# Patient Record
Sex: FEMALE | Race: WHITE | NOT HISPANIC OR LATINO
[De-identification: names, ages, dates, MRNs, and addresses within clinical notes are randomized per-mention and may not be internally consistent; named-entity substitution may affect disease eponyms.]

---

## 2020-01-17 ENCOUNTER — TRANSCRIPTION ENCOUNTER (OUTPATIENT)
Age: 80
End: 2020-01-17

## 2022-07-01 PROBLEM — Z00.00 ENCOUNTER FOR PREVENTIVE HEALTH EXAMINATION: Status: ACTIVE | Noted: 2022-07-01

## 2022-07-08 ENCOUNTER — APPOINTMENT (OUTPATIENT)
Dept: ORTHOPEDIC SURGERY | Facility: CLINIC | Age: 82
End: 2022-07-08

## 2022-07-08 VITALS — BODY MASS INDEX: 29.53 KG/M2 | WEIGHT: 173 LBS | HEIGHT: 64 IN

## 2022-07-08 PROCEDURE — 20610 DRAIN/INJ JOINT/BURSA W/O US: CPT

## 2022-07-08 PROCEDURE — J3490M: CUSTOM

## 2022-07-08 PROCEDURE — 99213 OFFICE O/P EST LOW 20 MIN: CPT | Mod: 25

## 2022-07-08 NOTE — HISTORY OF PRESENT ILLNESS
[] : yes [FreeTextEntry1] : left knee  [FreeTextEntry5] : patient started to feel pain again  in the knee about 2 weeks ago. Would like to discuss another cortisone inj  [FreeTextEntry7] : down into the calf

## 2022-07-08 NOTE — IMAGING
[de-identified] : Varus deformity\par Mild effusion, no warmth, no ecchymosis\par Medial joint line tenderness to palpation \par Range of motion 5-110 with associated crepitus\par 5/5 quadriceps and hamstring strength\par Ligamentously stable\par Motor and sensory intact distally\par Mildly antalgic gait\par Negative Veronica\par

## 2022-07-08 NOTE — PROCEDURE
[FreeTextEntry3] : - Large joint injection was performed of the LEFT  knee. \par - The indication for this procedure was pain and inflammation. Patient has tried OTC's including aspirin, Ibuprofen, Aleve, etc or prescription NSAIDS, and/or exercises at home and/or physical therapy without satisfactory response, patient had decreased mobility in the joint and the risks benefits, and alternatives have been discussed, and verbal consent was obtained. The site was prepped with alcohol, betadine, ethyl chloride sprayed topically and sterile technique used. \par - An injection of Betamethasone 2cc; Lidocaine 3cc; Marcaine 3cc injected.\par - Patient was advised to call if redness, pain or fever occur, apply ice for 15 minutes out of every hour for the next 12-24 hours as tolerated and patient was advised to rest the joint(s) for 2 days. \par

## 2022-11-11 ENCOUNTER — APPOINTMENT (OUTPATIENT)
Dept: ORTHOPEDIC SURGERY | Facility: CLINIC | Age: 82
End: 2022-11-11

## 2022-11-11 VITALS — WEIGHT: 173 LBS | HEIGHT: 64 IN | BODY MASS INDEX: 29.53 KG/M2

## 2022-11-11 DIAGNOSIS — I10 ESSENTIAL (PRIMARY) HYPERTENSION: ICD-10-CM

## 2022-11-11 PROCEDURE — J3490M: CUSTOM

## 2022-11-11 PROCEDURE — 99213 OFFICE O/P EST LOW 20 MIN: CPT | Mod: 25

## 2022-11-11 PROCEDURE — 20611 DRAIN/INJ JOINT/BURSA W/US: CPT | Mod: LT

## 2022-11-11 NOTE — IMAGING
[de-identified] : LEFT KNEE:\par Varus deformity\par Mild effusion, no warmth, no ecchymosis\par Medial joint line tenderness to palpation \par Range of motion 5-110 with associated crepitus\par 5/5 quadriceps and hamstring strength\par Ligamentously stable\par Motor and sensory intact distally\par Mildly antalgic gait\par Negative Veronica\par

## 2022-11-11 NOTE — ASSESSMENT
[FreeTextEntry1] : ACUTE EXACERBATION OF CHRONIC LEFT KNEE OA\par STABLE EXAM & SYMPTOMS\par REPEAT CSI DISCUSSED AND DONE TODAY, TOLERATED WELL\par CONSIDER VISCO IF SYMPTOMS PERSIST

## 2022-11-11 NOTE — HISTORY OF PRESENT ILLNESS
[9] : 9 [0] : 0 [Burning] : burning [Occasional] : occasional [Leisure] : leisure [Walking] : walking [Stairs] : stairs [de-identified] : 11/11/22: FOLLOW UP LEFT KNEE, PAIN RETURNING, GOOD RELIEF FROM CSI 4 MONTHS AGO [] : no [FreeTextEntry1] : left knee  [FreeTextEntry5] : Pt states that she hurt her knee while learning how to line dance in July 2022. Pt states she hasn’t returned because the pain was getting better but has been hurting for the past two weeks.  [FreeTextEntry7] : down into the calf and front of the leg [FreeTextEntry8] : getting out of the bed [FreeTextEntry9] : Aspercreme

## 2022-11-11 NOTE — PROCEDURE
[FreeTextEntry3] : Large Joint Injection was performed because of pain and inflammation. Anesthesia: ethyl chloride sprayed topically.. \par Celestone: 2 cc. \par Marcaine: 5 cc. \par \par Medication was injected in the LEFT KNEE. Patient has tried OTC's including aspirin, Ibuprofen, Aleve etc or prescription NSAIDS, and/or exercises at home and/ or physical therapy without satisfactory response and Patient has decreased mobility in the joint. The risks, benefits, and alternatives to cortisone injection were explained in full to the patient. Risks outlined include but are not limited to infection, sepsis, bleeding, scarring, skin discoloration, temporary increase in pain, syncopal episode, failure to resolve symptoms, allergic reaction, symptom recurrence, and elevation of blood sugar in diabetics. Patient understood the risks. All questions were answered. After discussion of options, patient requested an injection. Oral informed consent was obtained and sterile prep was done of the injection site with betadyne and alcohol. Sterile technique was utilized for the procedure including the preparation of the solutions used for the injection. Patient tolerated the procedure well. Advised to ice the injection site this evening. Post Procedure Instructions: Patient was advised to call if redness, pain, or fever occur and apply ice for 15 min. out of every hour for the next 12-24 hours as tolerated. patient was advised to rest the joint(s) for 2 days.

## 2023-01-09 ENCOUNTER — APPOINTMENT (OUTPATIENT)
Dept: ORTHOPEDIC SURGERY | Facility: CLINIC | Age: 83
End: 2023-01-09
Payer: MEDICARE

## 2023-01-09 VITALS — HEIGHT: 64 IN | WEIGHT: 173 LBS | BODY MASS INDEX: 29.53 KG/M2

## 2023-01-09 PROCEDURE — 99213 OFFICE O/P EST LOW 20 MIN: CPT

## 2023-01-09 NOTE — IMAGING
[de-identified] : LEFT KNEE:\par Varus deformity\par Mild effusion, no warmth, no ecchymosis\par Medial joint line tenderness to palpation \par Range of motion 5-110 with associated crepitus\par 5/5 quadriceps and hamstring strength\par Ligamentously stable\par Motor and sensory intact distally\par Mildly antalgic gait\par Negative Veronica\par

## 2023-01-09 NOTE — HISTORY OF PRESENT ILLNESS
[9] : 9 [0] : 0 [Burning] : burning [Intermittent] : intermittent [Leisure] : leisure [Injection therapy] : injection therapy [Walking] : walking [Stairs] : stairs [de-identified] : 11/11/22: FOLLOW UP LEFT KNEE, PAIN RETURNING, GOOD RELIEF FROM CSI 4 MONTHS AGO\par \par 01/09/23 here   for a follow up on the left knee ,pt had a csi 11/11/2022 with some relief  [] : no [FreeTextEntry1] : left knee  [FreeTextEntry5] : Pt states that she hurt her knee while learning how to line dance in July 2022. Pt states she hasn’t returned because the pain was getting better but has been hurting for the past two weeks.  [FreeTextEntry7] : down into the calf and front of the leg [FreeTextEntry8] : getting out of the bed [FreeTextEntry9] : Aspercreme  [de-identified] : csi left knee 11/11/2022

## 2023-01-09 NOTE — ASSESSMENT
[FreeTextEntry1] : ACUTE EXACERBATION OF CHRONIC LEFT KNEE OA\par STABLE EXAM & SYMPTOMS\par CSI 11/22 WITH SOME RELIEF BUT PAIN RETURNING\par DISCUSSED ORTHOVISC, WILL REQUEST FOR AUTH\par RTO P AUTH

## 2023-01-11 RX ORDER — HYALURONATE SODIUM 30 MG/2 ML
30 SYRINGE (ML) INTRAARTICULAR
Qty: 4 | Refills: 0 | Status: ACTIVE | COMMUNITY
Start: 2023-01-11 | End: 1900-01-01

## 2023-02-13 ENCOUNTER — APPOINTMENT (OUTPATIENT)
Dept: ORTHOPEDIC SURGERY | Facility: CLINIC | Age: 83
End: 2023-02-13
Payer: MEDICARE

## 2023-02-13 VITALS — HEIGHT: 64 IN | BODY MASS INDEX: 29.53 KG/M2 | WEIGHT: 173 LBS

## 2023-02-13 PROCEDURE — J3490M: CUSTOM

## 2023-02-13 PROCEDURE — 99213 OFFICE O/P EST LOW 20 MIN: CPT | Mod: 25

## 2023-02-13 PROCEDURE — 20611 DRAIN/INJ JOINT/BURSA W/US: CPT | Mod: LT

## 2023-02-13 NOTE — HISTORY OF PRESENT ILLNESS
[9] : 9 [0] : 0 [Burning] : burning [Intermittent] : intermittent [Leisure] : leisure [Injection therapy] : injection therapy [Walking] : walking [Stairs] : stairs [de-identified] : 11/11/22: FOLLOW UP LEFT KNEE, PAIN RETURNING, GOOD RELIEF FROM CSI 4 MONTHS AGO\par \par 01/09/23 here   for a follow up on the left knee ,pt had a csi 11/11/2022 with some relief \par \par 02/13/2023 here for a follow up had a csi injection last visit with some relief  [] : no [FreeTextEntry1] : left knee  [FreeTextEntry5] : Pt states that she hurt her knee while learning how to line dance in July 2022. Pt states she hasn’t returned because the pain was getting better but has been hurting for the past two weeks.  [FreeTextEntry7] : down into the calf and front of the leg [FreeTextEntry8] : getting out of the bed [FreeTextEntry9] : Aspercreme  [de-identified] : csi left knee 11/11/2022

## 2023-02-13 NOTE — IMAGING
[de-identified] : LEFT KNEE:\par Varus deformity\par Mild effusion, no warmth, no ecchymosis\par Medial joint line tenderness to palpation \par Range of motion 5-110 with associated crepitus\par 5/5 quadriceps and hamstring strength\par Ligamentously stable\par Motor and sensory intact distally\par Mildly antalgic gait\par Negative Veronica\par

## 2023-02-13 NOTE — ASSESSMENT
[FreeTextEntry1] : ACUTE EXACERBATION OF CHRONIC LEFT KNEE OA\par STABLE EXAM & SYMPTOMS\par CSI 3 MONTHS AGO WITH GOOD RELIEF\par ORTHOVISC COPAY TOO HIGH, WILL SET UP FOR VISCO-3\par REPEAT CSI DISCUSSED AND DONE TODAY, TOLERATED WELL\par RTO AFTER VISCO-3 SET UP

## 2023-02-13 NOTE — PROCEDURE
[FreeTextEntry3] : - Large joint injection was performed of the LEFT knee. \par - The indication for this procedure was pain and inflammation. Patient has tried OTC's including aspirin, Ibuprofen, Aleve, etc or prescription NSAIDS, and/or exercises at home and/or physical therapy without satisfactory response, patient had decreased mobility in the joint and the risks benefits, and alternatives have been discussed, and verbal consent was obtained. The site was prepped with alcohol, betadine, ethyl chloride sprayed topically and sterile technique used. \par - An injection of Betamethasone 2cc; Marcaine 5cc injected.\par - Patient was advised to call if redness, pain or fever occur, apply ice for 15 minutes out of every hour for the next 12-24 hours as tolerated and patient was advised to rest the joint(s) for 2 days. \par

## 2023-02-27 ENCOUNTER — APPOINTMENT (OUTPATIENT)
Dept: ORTHOPEDIC SURGERY | Facility: CLINIC | Age: 83
End: 2023-02-27
Payer: MEDICARE

## 2023-02-27 VITALS — WEIGHT: 173 LBS | BODY MASS INDEX: 29.53 KG/M2 | HEIGHT: 64 IN

## 2023-02-27 PROCEDURE — 20610 DRAIN/INJ JOINT/BURSA W/O US: CPT | Mod: LT

## 2023-02-27 PROCEDURE — 99024 POSTOP FOLLOW-UP VISIT: CPT

## 2023-02-27 NOTE — HISTORY OF PRESENT ILLNESS
[Gradual] : gradual [4] : 4 [3] : 3 [Localized] : localized [Intermittent] : intermittent [Household chores] : household chores [Injection therapy] : injection therapy [Standing] : standing [Walking] : walking [Stairs] : stairs [de-identified] : 02/27/2023 here to star left knee orthovisc # 1  [] : no [de-identified] : csi injection

## 2023-02-27 NOTE — IMAGING
[de-identified] : LEFT KNEE:\par Varus deformity\par Mild effusion, no warmth, no ecchymosis\par Medial joint line tenderness to palpation \par Range of motion 5-110 with associated crepitus\par 5/5 quadriceps and hamstring strength\par Ligamentously stable\par Motor and sensory intact distally\par Mildly antalgic gait\par Negative Veronica\par

## 2023-02-27 NOTE — PROCEDURE
[FreeTextEntry3] : Large joint injection  was performed of the LEFT  knee. \par The indication for this procedure was pain, inflammation and x-ray evidence of Osteoarthritis on this or prior visit. The site was prepped with alcohol, betadine, ethyl chloride sprayed topically and sterile technique used. \par An injection of ORTHOVISC 2ML series; #1  was used.  \par Patient was advised to call if redness, pain or fever occur, apply ice for 15 minutes out of every hour for the next 12-24 hours as tolerated and patient was advised to rest the joint(s) for 2 days. Patient has tried OTC's including aspirin, Ibuprofen, Aleve, etc or prescription NSAIDS, and/or exercises at home and/or physical therapy without satisfactory response, patient had decreased mobility in the joint and the risks benefits, and alternatives have been discussed, and verbal consent was obtained. \par

## 2023-02-27 NOTE — ASSESSMENT
[FreeTextEntry1] : STABLE EXAM & SYMPTOMS\par R/B/A ORTHOVISC DISCUSSED\par ORTHOVISC #1 LEFT KNEE, TOLERATED WELL\par RTO 1 WEEK TO CONT SERIES

## 2023-03-06 ENCOUNTER — APPOINTMENT (OUTPATIENT)
Dept: ORTHOPEDIC SURGERY | Facility: CLINIC | Age: 83
End: 2023-03-06
Payer: MEDICARE

## 2023-03-06 VITALS — WEIGHT: 173 LBS | HEIGHT: 64 IN | BODY MASS INDEX: 29.53 KG/M2

## 2023-03-06 DIAGNOSIS — M54.16 RADICULOPATHY, LUMBAR REGION: ICD-10-CM

## 2023-03-06 PROCEDURE — 99213 OFFICE O/P EST LOW 20 MIN: CPT | Mod: 25

## 2023-03-06 PROCEDURE — 20610 DRAIN/INJ JOINT/BURSA W/O US: CPT | Mod: LT

## 2023-03-06 RX ORDER — METHYLPREDNISOLONE 4 MG/1
4 TABLET ORAL
Qty: 1 | Refills: 0 | Status: ACTIVE | COMMUNITY
Start: 2023-03-06 | End: 1900-01-01

## 2023-03-06 NOTE — HISTORY OF PRESENT ILLNESS
[Gradual] : gradual [4] : 4 [3] : 3 [Localized] : localized [Intermittent] : intermittent [Household chores] : household chores [Injection therapy] : injection therapy [Standing] : standing [Walking] : walking [Stairs] : stairs [de-identified] : 02/27/2023 here to star left knee orthovisc # 1 \par \par 03/06/2023 left knee orthovisc # 2  [] : no [de-identified] : csi injection \par orthovisc injection

## 2023-03-06 NOTE — PROCEDURE
[FreeTextEntry3] : Large joint injection  was performed of the LEFT  knee. \par The indication for this procedure was pain, inflammation and x-ray evidence of Osteoarthritis on this or prior visit. The site was prepped with alcohol, betadine, ethyl chloride sprayed topically and sterile technique used. \par An injection of ORTHOVISC 2ML series; #2  was used.  \par Patient was advised to call if redness, pain or fever occur, apply ice for 15 minutes out of every hour for the next 12-24 hours as tolerated and patient was advised to rest the joint(s) for 2 days. Patient has tried OTC's including aspirin, Ibuprofen, Aleve, etc or prescription NSAIDS, and/or exercises at home and/or physical therapy without satisfactory response, patient had decreased mobility in the joint and the risks benefits, and alternatives have been discussed, and verbal consent was obtained. \par

## 2023-03-06 NOTE — ASSESSMENT
[FreeTextEntry1] : EXACERBATION OF CHRONIC LUMBAR RADICULOPATHY\par PRIOR RELIEF WITH MDP AND PT UNDER LIPPE, RX RENEWED TODAY\par ORTHOVISC #2 TODAY\par F/U 3-4 WKS WITH [facility]ARIO FOR THE BACK

## 2023-03-06 NOTE — IMAGING
[de-identified] : LEFT KNEE:\par Varus deformity\par Mild effusion, no warmth, no ecchymosis\par Medial joint line tenderness to palpation \par Range of motion 5-110 with associated crepitus\par 5/5 quadriceps and hamstring strength\par Ligamentously stable\par Motor and sensory intact distally\par Mildly antalgic gait\par Negative Veronica\par \par 5/5 Motor exam; no focal deficits.\par Positive straight leg raise\par No ankle clonus\par Negative Veronica\par Reflexes +2\par Stiff back gait pattern\par \par

## 2023-03-13 ENCOUNTER — APPOINTMENT (OUTPATIENT)
Dept: ORTHOPEDIC SURGERY | Facility: CLINIC | Age: 83
End: 2023-03-13
Payer: MEDICARE

## 2023-03-13 PROCEDURE — 99024 POSTOP FOLLOW-UP VISIT: CPT

## 2023-03-13 PROCEDURE — 20610 DRAIN/INJ JOINT/BURSA W/O US: CPT | Mod: LT

## 2023-03-13 NOTE — HISTORY OF PRESENT ILLNESS
[Gradual] : gradual [4] : 4 [3] : 3 [Localized] : localized [Intermittent] : intermittent [Household chores] : household chores [Injection therapy] : injection therapy [Standing] : standing [Walking] : walking [Stairs] : stairs [de-identified] : 02/27/2023 here to star left knee orthovisc # 1 \par \par 03/06/2023 left knee orthovisc # 2 \par \par 03/13/2023 left knee orthovisc injection # 3  [] : no [de-identified] : csi injection \par orthovisc injection

## 2023-03-13 NOTE — PROCEDURE
[FreeTextEntry3] : Large joint injection  was performed of the LEFT  knee. \par The indication for this procedure was pain, inflammation and x-ray evidence of Osteoarthritis on this or prior visit. The site was prepped with alcohol, betadine, ethyl chloride sprayed topically and sterile technique used. \par An injection of ORTHOVISC 2ML series; #3  was used.  \par Patient was advised to call if redness, pain or fever occur, apply ice for 15 minutes out of every hour for the next 12-24 hours as tolerated and patient was advised to rest the joint(s) for 2 days. Patient has tried OTC's including aspirin, Ibuprofen, Aleve, etc or prescription NSAIDS, and/or exercises at home and/or physical therapy without satisfactory response, patient had decreased mobility in the joint and the risks benefits, and alternatives have been discussed, and verbal consent was obtained. \par

## 2023-03-20 ENCOUNTER — APPOINTMENT (OUTPATIENT)
Dept: ORTHOPEDIC SURGERY | Facility: CLINIC | Age: 83
End: 2023-03-20
Payer: MEDICARE

## 2023-03-20 DIAGNOSIS — M17.12 UNILATERAL PRIMARY OSTEOARTHRITIS, LEFT KNEE: ICD-10-CM

## 2023-03-20 PROCEDURE — 20610 DRAIN/INJ JOINT/BURSA W/O US: CPT | Mod: LT

## 2023-03-20 PROCEDURE — 99024 POSTOP FOLLOW-UP VISIT: CPT

## 2023-03-20 NOTE — PROCEDURE
[FreeTextEntry3] : Large joint injection  was performed of the LEFT  knee. \par The indication for this procedure was pain, inflammation and x-ray evidence of Osteoarthritis on this or prior visit. The site was prepped with alcohol, betadine, ethyl chloride sprayed topically and sterile technique used. \par An injection of ORTHOVISC 2ML series; #4  was used.  \par Patient was advised to call if redness, pain or fever occur, apply ice for 15 minutes out of every hour for the next 12-24 hours as tolerated and patient was advised to rest the joint(s) for 2 days. Patient has tried OTC's including aspirin, Ibuprofen, Aleve, etc or prescription NSAIDS, and/or exercises at home and/or physical therapy without satisfactory response, patient had decreased mobility in the joint and the risks benefits, and alternatives have been discussed, and verbal consent was obtained. \par

## 2024-07-31 ENCOUNTER — APPOINTMENT (OUTPATIENT)
Dept: ORTHOPEDIC SURGERY | Facility: CLINIC | Age: 84
End: 2024-07-31

## 2024-07-31 VITALS — BODY MASS INDEX: 26.05 KG/M2 | HEIGHT: 63 IN | WEIGHT: 147 LBS

## 2024-07-31 DIAGNOSIS — M65.351 TRIGGER FINGER, RIGHT LITTLE FINGER: ICD-10-CM

## 2024-07-31 PROCEDURE — 99204 OFFICE O/P NEW MOD 45 MIN: CPT | Mod: 25

## 2024-07-31 PROCEDURE — 20550 NJX 1 TENDON SHEATH/LIGAMENT: CPT | Mod: RT

## 2024-07-31 RX ORDER — ALPRAZOLAM 0.5 MG/1
0.5 TABLET ORAL
Refills: 0 | Status: ACTIVE | COMMUNITY

## 2024-07-31 RX ORDER — LISINOPRIL 30 MG/1
TABLET ORAL
Refills: 0 | Status: ACTIVE | COMMUNITY

## 2024-07-31 RX ORDER — LOVASTATIN 20 MG/1
20 TABLET ORAL
Refills: 0 | Status: ACTIVE | COMMUNITY

## 2024-07-31 NOTE — ASSESSMENT
[FreeTextEntry1] : We reviewed the anatomy of the flexor sheath and pathology of trigger fingers with the use of drawings and discussion.  We discussed the treatment options including splinting/nsaids, injection and surgery.  We discussed that too many injections may lead to weakening o the tendon/tendon rupture and the safety of two injections. After a discussion of the risks, benefits and alternatives along with the expectations, the patient was amenable to injection.  The indication for injection is pain and inflammation.  The skin overlying the tendon sheath/A1 pulley site was prepared with alcohol and ethyl chloride was sprayed topically.  Sterile technique was used. An injection of 1ml of lidocaine and 6mg of betamethasone was used.  The patient was instructed to call if redness, pain or fever occur.  They ay apply ice for 15 minutes eery hour for the next 12-24 hours as tolerated. The patient understands that it may take 2-5 days to see a noticeable difference.  Sterile Band-Aid was applied.  injection #2 for right little finger

## 2024-07-31 NOTE — IMAGING
[de-identified] : right pinky TTP a1 pulley +triggering otherwise farom neurovascular intact distally

## 2024-07-31 NOTE — HISTORY OF PRESENT ILLNESS
[de-identified] : 7/31/2024: pt presents with pain and locking in right pinky x 1 month; had similar problem with left pinky in January and she was given a CSI injection at [facility]. Notes finger is catching.  [FreeTextEntry4] : 1 month

## 2024-10-30 ENCOUNTER — NON-APPOINTMENT (OUTPATIENT)
Age: 84
End: 2024-10-30

## 2024-10-30 ENCOUNTER — APPOINTMENT (OUTPATIENT)
Dept: ORTHOPEDIC SURGERY | Facility: CLINIC | Age: 84
End: 2024-10-30
Payer: MEDICARE

## 2024-10-30 ENCOUNTER — APPOINTMENT (OUTPATIENT)
Dept: OTOLARYNGOLOGY | Facility: CLINIC | Age: 84
End: 2024-10-30
Payer: MEDICARE

## 2024-10-30 VITALS
HEIGHT: 63 IN | WEIGHT: 151 LBS | DIASTOLIC BLOOD PRESSURE: 76 MMHG | SYSTOLIC BLOOD PRESSURE: 152 MMHG | HEART RATE: 79 BPM | BODY MASS INDEX: 26.75 KG/M2

## 2024-10-30 DIAGNOSIS — Z86.39 PERSONAL HISTORY OF OTHER ENDOCRINE, NUTRITIONAL AND METABOLIC DISEASE: ICD-10-CM

## 2024-10-30 DIAGNOSIS — M65.351 TRIGGER FINGER, RIGHT LITTLE FINGER: ICD-10-CM

## 2024-10-30 DIAGNOSIS — H90.3 SENSORINEURAL HEARING LOSS, BILATERAL: ICD-10-CM

## 2024-10-30 DIAGNOSIS — Z78.9 OTHER SPECIFIED HEALTH STATUS: ICD-10-CM

## 2024-10-30 PROCEDURE — 92567 TYMPANOMETRY: CPT

## 2024-10-30 PROCEDURE — 99213 OFFICE O/P EST LOW 20 MIN: CPT | Mod: 25

## 2024-10-30 PROCEDURE — 20550 NJX 1 TENDON SHEATH/LIGAMENT: CPT | Mod: RT

## 2024-10-30 PROCEDURE — 92557 COMPREHENSIVE HEARING TEST: CPT

## 2024-10-30 PROCEDURE — 99204 OFFICE O/P NEW MOD 45 MIN: CPT

## 2025-03-05 ENCOUNTER — APPOINTMENT (OUTPATIENT)
Dept: ORTHOPEDIC SURGERY | Facility: CLINIC | Age: 85
End: 2025-03-05
Payer: MEDICARE

## 2025-03-05 DIAGNOSIS — M65.351 TRIGGER FINGER, RIGHT LITTLE FINGER: ICD-10-CM

## 2025-03-05 DIAGNOSIS — M65.352 TRIGGER FINGER, LEFT LITTLE FINGER: ICD-10-CM

## 2025-03-05 PROCEDURE — 99214 OFFICE O/P EST MOD 30 MIN: CPT

## 2025-03-21 ENCOUNTER — APPOINTMENT (OUTPATIENT)
Age: 85
End: 2025-03-21
Payer: MEDICARE

## 2025-03-21 PROCEDURE — 26055 INCISE FINGER TENDON SHEATH: CPT | Mod: 59,F4

## 2025-03-21 PROCEDURE — 26055 INCISE FINGER TENDON SHEATH: CPT | Mod: AS,F9

## 2025-03-21 RX ORDER — OXYCODONE AND ACETAMINOPHEN 5; 325 MG/1; MG/1
5-325 TABLET ORAL EVERY 4 HOURS
Qty: 18 | Refills: 0 | Status: ACTIVE | COMMUNITY
Start: 2025-03-21 | End: 1900-01-01

## 2025-03-21 RX ORDER — HYDROCODONE BITARTRATE AND ACETAMINOPHEN 5; 325 MG/1; MG/1
5-325 TABLET ORAL
Qty: 10 | Refills: 0 | Status: DISCONTINUED | COMMUNITY
Start: 2025-03-20 | End: 2025-03-21

## 2025-04-02 ENCOUNTER — APPOINTMENT (OUTPATIENT)
Dept: ORTHOPEDIC SURGERY | Facility: CLINIC | Age: 85
End: 2025-04-02
Payer: MEDICARE

## 2025-04-02 DIAGNOSIS — M65.352 TRIGGER FINGER, LEFT LITTLE FINGER: ICD-10-CM

## 2025-04-02 DIAGNOSIS — M65.351 TRIGGER FINGER, RIGHT LITTLE FINGER: ICD-10-CM

## 2025-04-02 PROCEDURE — 99024 POSTOP FOLLOW-UP VISIT: CPT
